# Patient Record
Sex: MALE | Race: WHITE | NOT HISPANIC OR LATINO | Employment: STUDENT | URBAN - METROPOLITAN AREA
[De-identification: names, ages, dates, MRNs, and addresses within clinical notes are randomized per-mention and may not be internally consistent; named-entity substitution may affect disease eponyms.]

---
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
## 2024-01-24 ENCOUNTER — ATHLETIC TRAINING (OUTPATIENT)
Dept: SPORTS MEDICINE | Facility: OTHER | Age: 24
End: 2024-01-24

## 2024-01-24 DIAGNOSIS — S76.312A HAMSTRING STRAIN, LEFT, INITIAL ENCOUNTER: Primary | ICD-10-CM

## 2024-01-24 NOTE — PROGRESS NOTES
"AT Evaluation      Assessment/Plan G1 Hamstring Strain    Subjective Ath presented to the clinic with CC of L sided HS P! On 1/22/24. RAPHAEL is sprinting 30 yd during workout and feeling a \"pull\" with leg extension. Ath has no prior PMHx of HS issues. Ath describes the P! As dull and tight.     Ath reported he maxed out on LE lifts the day before injury.    Objective Ath had FROM with leg flex/ext with no P! Noted.   MMT revelaed 5/5 with slight P! In HS. 5/5 Hip ext no P!.          Precautions: Ath is currently limited to 75% FWD running and hitting as tolerated.       Manuals             Self str 3x30            Soft tissue mob 5'                                      Neuro Re-Ed                                                                                                        Ther Ex             SL Bridge Towel slides 2x8            Forefoot elevated RDLs 2x10 20#            Kneeling Banded eccentric HS pulls 2x8            Monster walks 3x BLK TB                                                                Ther Activity             FWD/BKWD running 2x            Cone suffle into FWD/BKWD sprint 3x            SL distance hops 1x            SL balance on CHANG Airex double ball catch 2x10                         Modalities                                         Plan: Ath will report 3x/week to progress with HS strength and return to running HS protocol. Ath reported he felt great after today's session and had no P! With movements.     "

## 2024-01-25 ENCOUNTER — ATHLETIC TRAINING (OUTPATIENT)
Dept: SPORTS MEDICINE | Facility: OTHER | Age: 24
End: 2024-01-25

## 2024-01-25 DIAGNOSIS — S76.312A HAMSTRING STRAIN, LEFT, INITIAL ENCOUNTER: Primary | ICD-10-CM

## 2024-01-25 NOTE — PROGRESS NOTES
"Ath reported for rehab today feeling minor soreness. Ath reports no P! In hamstring and is \"ready for more work\".     Ath completed rehab and tolerated increased SL plyo exercises.     Running progression included:  FWD runnin @ 50%, 2 @ 75%, 2 at 80-90%  -Ath mentioned small tightness in HS with higher intensity sprints.   Reaction drills 3x5      Treatment Log:     Date:  24   Playing Status:  Limited high intensity sprints         Exercise/Treatment      SL towels slide outs  2x10    Ecc table slides w towel  2x8    Plyo box jumps SL  3x10                                                      Plan: Ath will progress with rehab and running drills tomorrow.          "

## 2024-01-29 ENCOUNTER — ATHLETIC TRAINING (OUTPATIENT)
Dept: SPORTS MEDICINE | Facility: OTHER | Age: 24
End: 2024-01-29

## 2024-01-29 DIAGNOSIS — S76.312A HAMSTRING STRAIN, LEFT, INITIAL ENCOUNTER: Primary | ICD-10-CM

## 2024-01-29 NOTE — PROGRESS NOTES
Athletic Training Progress Note    Name: Murali Montgomery  Age: 23 y.o.     Assessment/Plan:     Visit Diagnosis: Hamstring strain, left, initial encounter [M09.301W]    Treatment Plan:     []  Follow-up PRN.   []  Follow-up prior to next practice/game for re-evaluation.  [x]  Daily treatment/rehab. Progress note expected weekly.     Subjective: Ath notes he is feeling great and is progressing appropriately. Ath only reports tightness occasionally with batting.    Ath will start to progress with plyos and running progression today.    Objective:   See treatment log below    Treatment Log:     Date: 1/29/24       Playing Status: Limited               Exercise/Treatment        Sl Elevated bridge with jump 2x10       Prone SL ecc clin resis 2x6x5s       SL lateral hops, DL box jump 1x10       DL repetitive jumps 3x15s                                                                 Date:  1/25/24   Playing Status:  Limited high intensity sprints         Exercise/Treatment      SL towels slide outs  2x10    Ecc table slides w towel  2x8    Plyo box jumps SL  3x10                                                    Running Progression:  Reached 80-90% sprint with rolling start      Plan: Ath will progress with rehab and running drills this week. Ath will look to progress to sport specific running and drills by the end of the week.

## 2024-02-01 ENCOUNTER — ATHLETIC TRAINING (OUTPATIENT)
Dept: SPORTS MEDICINE | Facility: OTHER | Age: 24
End: 2024-02-01

## 2024-02-01 DIAGNOSIS — S76.312A STRAIN OF LEFT HAMSTRING, INITIAL ENCOUNTER: Primary | ICD-10-CM

## 2024-02-01 NOTE — PROGRESS NOTES
Athletic Training Daily Note    Name: Murali Montgomery  Age: 23 y.o.     Visit Diagnosis: Strain of left hamstring, initial encounter [S76.369A]    Subjective: Ath reports to [facility] for rehab.    Objective:     Treatment Log:     Date: 1/29/24       Playing Status: Limited               Exercise/Treatment        Sl Elevated bridge with jump 2x10       Prone SL ecc clin resis 2x6x5s       SL lateral hops, DL box jump 1x10       DL repetitive jumps 3x15s                                                                 Date:  1/25/24   Playing Status:  Limited high intensity sprints         Exercise/Treatment      SL towels slide outs  2x10    Ecc table slides w towel  2x8    Plyo box jumps SL  3x10                                                    Running Progression:  Reached 80-90% sprint with rolling start      Plan: Ath will progress with rehab and running drills this week. Ath will look to progress to sport specific running and drills by the end of the week.

## 2024-02-05 ENCOUNTER — ATHLETIC TRAINING (OUTPATIENT)
Dept: SPORTS MEDICINE | Facility: OTHER | Age: 24
End: 2024-02-05

## 2024-02-05 DIAGNOSIS — S76.312A HAMSTRING STRAIN, LEFT, INITIAL ENCOUNTER: Primary | ICD-10-CM

## 2024-02-05 NOTE — PROGRESS NOTES
"Athletic Training Daily Note    Name: Murali Montgomery  Age: 23 y.o.     Visit Diagnosis: Hamstring strain, left, initial encounter [S74.379S]    Subjective: Ath reports to [facility] for rehab. Ath reports he feels great today. Only complaint is end-range tightness with 100% intensity running.     Objective: Ath has achieved % running intensity and will be adding in more SL plyo exercises this week. Objective is to increase all plyo/running to 100% with sport specific movements.     Treatment Log:     Date: 1/29/24 2/5/24      Playing Status: Limited               Exercise/Treatment        Sl Elevated bridge with jump 2x10       Prone SL ecc clin resis 2x6x5s       SL lateral hops, DL box jump 1x10       DL repetitive jumps 3x15s       SL Bridge w towel slide  2x10      SL Band-Assisted maximal jumps  2x15s      \" Lateral jumps  2x15s      SL repetitive distance hops with sprint  2x      Directional point drill (4 directions)  2x      Suicide Bball FWD/BKWD  1x                Date:  1/25/24   Playing Status:  Limited high intensity sprints         Exercise/Treatment      SL towels slide outs  2x10    Ecc table slides w towel  2x8    Plyo box jumps SL  3x10                                                    Running Progression:  Reached 80-90% sprint with rolling start      Plan: Ath will progress with rehab and running drills this week. Ath will look to progress to sport specific running and drills by the end of the week.              "

## 2024-02-08 ENCOUNTER — ATHLETIC TRAINING (OUTPATIENT)
Dept: SPORTS MEDICINE | Facility: OTHER | Age: 24
End: 2024-02-08

## 2024-02-08 DIAGNOSIS — S76.312A HAMSTRING STRAIN, LEFT, INITIAL ENCOUNTER: Primary | ICD-10-CM

## 2024-02-08 NOTE — PROGRESS NOTES
"Athletic Training Daily Note    Name: Murali Montgomery  Age: 23 y.o.     Visit Diagnosis: Hamstring strain, left, initial encounter [S64.844N]    Subjective: Ath reports to [facility] for rehab. Ath reports no P! With baseball px and running. Reports he has doubts mentally about getting up to 100% intensity running. Even though athlete seems to be reaching 100% with no issues or compensations.     Objective: Ath denies any PT or reoccurring P! In L HS. No restrictions at this time.      Treatment Log:     Date: 1/29/24 2/5/24 2/8     Playing Status: Limited As tolerated As tolerated             Exercise/Treatment        Sl Elevated bridge with jump 2x10       Prone SL ecc clin resis 2x6x5s       SL lateral hops, DL box jump 1x10       DL repetitive jumps 3x15s       SL Bridge w towel slide  2x10      SL Band-Assisted maximal jumps  2x15s      \" Lateral jumps  2x15s      SL repetitive distance hops with sprint  2x      Directional point drill (4 directions)  2x      Suicide Bball FWD/BKWD  1x      End range HS curls supine   2x10     SL lateral hops into box jump   4x6 (random jump stimulus)      [place] HS curls   2x12               Running Progression:  50, 75, 90, 100% sprints 1x.  Zig-zag drill with FWD sprint and ball tracking - 6x  Reaction ball drill - 8x      Date:  1/25/24   Playing Status:  Limited high intensity sprints         Exercise/Treatment      SL towels slide outs  2x10    Ecc table slides w towel  2x8    Plyo box jumps SL  3x10                                                    Running Progression:  Reached 80-90% sprint with rolling start      Plan: Ath will progress with rehab and running drills this week. Ath will look to progress to sport specific running and drills by the end of the week.                "

## 2024-02-14 ENCOUNTER — ATHLETIC TRAINING (OUTPATIENT)
Dept: SPORTS MEDICINE | Facility: OTHER | Age: 24
End: 2024-02-14

## 2024-02-14 DIAGNOSIS — S76.309A HAMSTRING INJURY, INITIAL ENCOUNTER: Primary | ICD-10-CM

## 2024-02-14 NOTE — PROGRESS NOTES
"Athletic Training Daily Note    Name: Murali Montgomery  Age: 23 y.o.     Visit Diagnosis: Hamstring injury, initial encounter [S76.309A]    Subjective: Ath reports to [facility] for rehab.    Objective:     Treatment Log:     Date: 1/29/24 2/5/24 2/8 2/14    Playing Status: Limited As tolerated As tolerated As tolerated             Exercise/Treatment        Sl Elevated bridge with jump 2x10       Prone SL ecc clin resis 2x6x5s       SL lateral hops, DL box jump 1x10       DL repetitive jumps 3x15s       SL Bridge w towel slide  2x10      SL Band-Assisted maximal jumps  2x15s      \" Lateral jumps  2x15s      SL repetitive distance hops with sprint  2x      Directional point drill (4 directions)  2x      Suicide Bball FWD/BKWD  1x      End range HS curls supine   2x10 2x10    SL lateral hops into box jump   4x6 (random jump stimulus)      Carl HS curls   2x12 3x8              Running Progression:  50, 75, 90, 100% sprints 1x.  Zig-zag drill with FWD sprint and ball tracking - 6x  Reaction ball drill - 8x      Date:  1/25/24   Playing Status:  Limited high intensity sprints         Exercise/Treatment      SL towels slide outs  2x10    Ecc table slides w towel  2x8    Plyo box jumps SL  3x10                                                    Running Progression:  Reached 80-90% sprint with rolling start      Plan: Ath will progress with rehab and running drills this week. Ath will look to progress to sport specific running and drills by the end of the week.                  "

## 2024-04-25 ENCOUNTER — APPOINTMENT (OUTPATIENT)
Dept: RADIOLOGY | Facility: AMBULARY SURGERY CENTER | Age: 24
End: 2024-04-25
Attending: ORTHOPAEDIC SURGERY
Payer: COMMERCIAL

## 2024-04-25 VITALS
DIASTOLIC BLOOD PRESSURE: 68 MMHG | HEIGHT: 70 IN | SYSTOLIC BLOOD PRESSURE: 110 MMHG | WEIGHT: 173.6 LBS | BODY MASS INDEX: 24.85 KG/M2 | HEART RATE: 54 BPM

## 2024-04-25 DIAGNOSIS — M79.641 RIGHT HAND PAIN: ICD-10-CM

## 2024-04-25 DIAGNOSIS — S62.356A CLOSED NONDISPLACED FRACTURE OF SHAFT OF FIFTH METACARPAL BONE OF RIGHT HAND, INITIAL ENCOUNTER: Primary | ICD-10-CM

## 2024-04-25 PROCEDURE — 29085 APPL CAST HAND&LWR FOREARM: CPT | Performed by: ORTHOPAEDIC SURGERY

## 2024-04-25 PROCEDURE — 29075 APPL CST ELBW FNGR SHORT ARM: CPT | Performed by: ORTHOPAEDIC SURGERY

## 2024-04-25 PROCEDURE — 99204 OFFICE O/P NEW MOD 45 MIN: CPT | Performed by: ORTHOPAEDIC SURGERY

## 2024-04-25 PROCEDURE — 73130 X-RAY EXAM OF HAND: CPT

## 2024-04-25 RX ORDER — NAPROXEN 500 MG/1
500 TABLET ORAL 2 TIMES DAILY WITH MEALS
Qty: 20 TABLET | Refills: 0 | Status: SHIPPED | OUTPATIENT
Start: 2024-04-25

## 2024-04-25 NOTE — PROGRESS NOTES
Assessment:       1. Right hand pain    2. Closed nondisplaced fracture of shaft of fifth metacarpal bone of right hand, initial encounter          Plan:        Explained my current clinical findings and reviewed the radiological findings with the patient today.  He sustained a nondisplaced right hand fifth metacarpal shaft fracture.  We discussed both surgical and nonsurgical management options in this regard.  Currently does not have any significant clinical deformity or rotation and hence we participated in shared decision making to proceed with nonsurgical management.  He was placed in a short arm cast including immobilization of the right hand fourth and fifth digits in an intrinsic plus position.  Cast immobilization would likely be for about 6 weeks.  Will follow-up in about 10 days for radiological reassessment in the cast.  Cast care precautions were explained.  Will prescribe oral naproxen for pain relief as needed.  Patient expressed understanding and was in agreement with the treatment plan.            Subjective:     Patient ID: Murali Montgomery is a 23 y.o. male.    Chief Complaint: Right hand injury    HPI  Murali is a 23-year-old right-hand-dominant [employer]  who presents today for evaluation of acute right hand injury sustained yesterday while playing baseball.  Patient reports having a direct impact on the ulnar aspect of right hand against a metal bar.  Denies any tingling numbness of the right hand distally.  Denies any proximal wrist pain.     Social History     Occupational History    Not on file   Tobacco Use    Smoking status: Never    Smokeless tobacco: Never   Vaping Use    Vaping status: Never Used   Substance and Sexual Activity    Alcohol use: Not Currently    Drug use: Never    Sexual activity: Not on file      Review of Systems        Objective:     Ortho ExamPhysical Exam  Vitals and nursing note reviewed.   Constitutional:       Appearance: Normal  appearance. He is well-developed.   HENT:      Head: Normocephalic.   Cardiovascular:      Rate and Rhythm: Normal rate.   Pulmonary:      Effort: Pulmonary effort is normal. No respiratory distress.   Skin:     General: Skin is warm.      Findings: No erythema.   Neurological:      Mental Status: He is alert and oriented to person, place, and time.   Psychiatric:         Behavior: Behavior normal.         Thought Content: Thought content normal.         Judgment: Judgment normal.         Right hand exam:  Mild swelling along the ulnar aspect of the dorsal right hand.  No deformity noted.  Normal finger cascade.  Tender to palpation over the fifth metacarpal shaft.  No other areas of right hand or wrist tenderness.  Clinically intact distal neurovascular status.    I have personally reviewed pertinent films in PACS and my interpretation is plain radiograph of the right hand performed today reveals an oblique nondisplaced fracture of the fifth metacarpal shaft without any significant angulation or rotation..    Cast application    Date/Time: 4/25/2024 10:45 AM    Performed by: Petrona Manzo MD  Authorized by: Petrona Manzo MD  Universal Protocol:  Consent: Verbal consent obtained.  Risks and benefits: risks, benefits and alternatives were discussed  Consent given by: patient  Patient understanding: patient states understanding of the procedure being performed  Site marked: the operative site was marked  Radiology Images displayed and confirmed. If images not available, report reviewed: imaging studies available  Required items: required blood products, implants, devices, and special equipment available  Patient identity confirmed: verbally with patient    Pre-procedure details:     Sensation:  Normal  Procedure details:     Laterality:  Right    Location:  Hand    Hand:  R handCast type:  Gauntlet and short arm        Supplies:  Cotton padding and fiberglass  Post-procedure details:     Pain:  Improved     Sensation:  Normal    Patient tolerance of procedure:  Tolerated well, no immediate complications
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
## 2024-05-06 ENCOUNTER — APPOINTMENT (OUTPATIENT)
Dept: RADIOLOGY | Facility: OTHER | Age: 24
End: 2024-05-06
Payer: COMMERCIAL

## 2024-05-06 VITALS — HEART RATE: 64 BPM | SYSTOLIC BLOOD PRESSURE: 119 MMHG | DIASTOLIC BLOOD PRESSURE: 63 MMHG

## 2024-05-06 DIAGNOSIS — M79.641 RIGHT HAND PAIN: ICD-10-CM

## 2024-05-06 DIAGNOSIS — S62.356D CLOSED NONDISPLACED FRACTURE OF SHAFT OF FIFTH METACARPAL BONE OF RIGHT HAND WITH ROUTINE HEALING, SUBSEQUENT ENCOUNTER: ICD-10-CM

## 2024-05-06 DIAGNOSIS — S62.356D CLOSED NONDISPLACED FRACTURE OF SHAFT OF FIFTH METACARPAL BONE OF RIGHT HAND WITH ROUTINE HEALING, SUBSEQUENT ENCOUNTER: Primary | ICD-10-CM

## 2024-05-06 PROCEDURE — 73130 X-RAY EXAM OF HAND: CPT

## 2024-05-06 PROCEDURE — 99213 OFFICE O/P EST LOW 20 MIN: CPT | Performed by: ORTHOPAEDIC SURGERY
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
## 2024-06-03 ENCOUNTER — APPOINTMENT (OUTPATIENT)
Dept: RADIOLOGY | Facility: OTHER | Age: 24
End: 2024-06-03
Payer: COMMERCIAL

## 2024-06-03 VITALS
BODY MASS INDEX: 25.2 KG/M2 | HEART RATE: 75 BPM | WEIGHT: 176 LBS | DIASTOLIC BLOOD PRESSURE: 67 MMHG | HEIGHT: 70 IN | SYSTOLIC BLOOD PRESSURE: 119 MMHG

## 2024-06-03 DIAGNOSIS — S62.356D CLOSED NONDISPLACED FRACTURE OF SHAFT OF FIFTH METACARPAL BONE OF RIGHT HAND WITH ROUTINE HEALING, SUBSEQUENT ENCOUNTER: ICD-10-CM

## 2024-06-03 DIAGNOSIS — M79.641 RIGHT HAND PAIN: ICD-10-CM

## 2024-06-03 DIAGNOSIS — S62.356D CLOSED NONDISPLACED FRACTURE OF SHAFT OF FIFTH METACARPAL BONE OF RIGHT HAND WITH ROUTINE HEALING, SUBSEQUENT ENCOUNTER: Primary | ICD-10-CM

## 2024-06-03 PROCEDURE — 99213 OFFICE O/P EST LOW 20 MIN: CPT | Performed by: ORTHOPAEDIC SURGERY

## 2024-06-03 PROCEDURE — 73130 X-RAY EXAM OF HAND: CPT

## 2024-06-03 RX ORDER — SULFAMETHOXAZOLE AND TRIMETHOPRIM 800; 160 MG/1; MG/1
1 TABLET ORAL 2 TIMES DAILY
COMMUNITY
Start: 2024-05-29

## 2024-06-03 RX ORDER — AZITHROMYCIN 250 MG/1
TABLET, FILM COATED ORAL
COMMUNITY
Start: 2024-05-12

## 2024-06-03 RX ORDER — ALBUTEROL SULFATE 90 UG/1
AEROSOL, METERED RESPIRATORY (INHALATION)
COMMUNITY
Start: 2024-05-28

## 2024-06-03 RX ORDER — PREDNISONE 10 MG/1
TABLET ORAL
COMMUNITY
Start: 2024-05-06

## 2024-07-09 ENCOUNTER — APPOINTMENT (OUTPATIENT)
Dept: RADIOLOGY | Facility: OTHER | Age: 24
End: 2024-07-09
Payer: COMMERCIAL

## 2024-07-09 ENCOUNTER — OFFICE VISIT (OUTPATIENT)
Dept: OBGYN CLINIC | Facility: OTHER | Age: 24
End: 2024-07-09
Payer: COMMERCIAL

## 2024-07-09 VITALS
HEIGHT: 70 IN | SYSTOLIC BLOOD PRESSURE: 108 MMHG | HEART RATE: 58 BPM | WEIGHT: 178.2 LBS | BODY MASS INDEX: 25.51 KG/M2 | DIASTOLIC BLOOD PRESSURE: 66 MMHG

## 2024-07-09 DIAGNOSIS — S62.356D CLOSED NONDISPLACED FRACTURE OF SHAFT OF FIFTH METACARPAL BONE OF RIGHT HAND WITH ROUTINE HEALING, SUBSEQUENT ENCOUNTER: ICD-10-CM

## 2024-07-09 DIAGNOSIS — S62.356D CLOSED NONDISPLACED FRACTURE OF SHAFT OF FIFTH METACARPAL BONE OF RIGHT HAND WITH ROUTINE HEALING, SUBSEQUENT ENCOUNTER: Primary | ICD-10-CM

## 2024-07-09 PROCEDURE — 99213 OFFICE O/P EST LOW 20 MIN: CPT | Performed by: ORTHOPAEDIC SURGERY

## 2024-07-09 PROCEDURE — 73130 X-RAY EXAM OF HAND: CPT
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
